# Patient Record
(demographics unavailable — no encounter records)

---

## 2024-12-19 NOTE — DISCUSSION/SUMMARY
[de-identified] : Rest, ice. Nsaids prn.  Wan-loop advised.  No gym/ sports at this time.  Follow up in 1-2 weeks with hand for re-eval.

## 2024-12-19 NOTE — HISTORY OF PRESENT ILLNESS
[de-identified] :  12/19/24: Pt is a 10 y/o male presenting of right middle finger pain. Pt states he jammed his finger to a ball during recess yesterday. No previous injury. Ice to affected area.  [FreeTextEntry1] : right middle finger

## 2024-12-19 NOTE — IMAGING
[Right] : right fingers [No acute displaced fracture or dislocation] : No acute displaced fracture or dislocation [Open growth plates] : Open growth plates

## 2024-12-19 NOTE — PHYSICAL EXAM
[Right] : right hand [NL (75)] : Dorsiflexion 75 degrees [NL (85)] : volarflexion 85 degrees [NL (25)] : radial deviation 25 degrees [NL (40)] : ulnar deviation 40 degrees [NL (90)] : supination 90 degrees [3rd] : 3rd [PIP Joint] : PIP joint [] : good capillary refill in all fingers

## 2025-01-08 NOTE — PHYSICAL EXAM
[de-identified] : R MF  Mi nswelling Nontender FROM No instabuility   Todays Xrays PA/Lateral/Oblique of the finger shows no fracture

## 2025-01-08 NOTE — HISTORY OF PRESENT ILLNESS
[de-identified] : KAREN KIRKPATRICK jamming injury 2 weeks ago in gym class  He has been uddy taping with releif

## 2025-01-08 NOTE — PHYSICAL EXAM
[de-identified] : R MF  Mi nswelling Nontender FROM No instabuility   Todays Xrays PA/Lateral/Oblique of the finger shows no fracture

## 2025-01-08 NOTE — HISTORY OF PRESENT ILLNESS
[de-identified] : KAREN KIRKPATRICK jamming injury 2 weeks ago in gym class  He has been uddy taping with releif

## 2025-01-08 NOTE — ASSESSMENT
[FreeTextEntry1] : The patient was advised of the diagnosis. The natural history of the pathology was explained in full to the patient in layman's terms. All questions were answered. The risks and benefits of surgical and non-surgical treatment alternatives were explained in full to the patient.  This is an acute self limiting problem   I recommend the patient take over the counter medication such as Advil/Motrin/Alleve or Tylenol for pain and inflammation No more ester tape Return prn

## 2025-01-08 NOTE — REASON FOR VISIT
[FreeTextEntry2] :  01/06/2025 ROGELIO ESPINOZA is a 10-year-old male here today for: R MF pain. Saw GENTRY Heaton at urgent care on Dec 19 and reports he had jammed his finger to a ball during recess. Re-doing x-rays.